# Patient Record
(demographics unavailable — no encounter records)

---

## 2024-12-09 NOTE — HISTORY OF PRESENT ILLNESS
[5] : 5 [Dull/Aching] : dull/aching [Tingling] : tingling [de-identified] : R elbow increased size at the olecranon 15 years ago It has been worse recently  Increased pain and swelling [FreeTextEntry1] : RT elbow [FreeTextEntry6] : swelling, discomfort, numb

## 2024-12-09 NOTE — PHYSICAL EXAM
[de-identified] : R elbow  Olecraon swelling Tender olecranon Palp bone spur Stiffness with extension  No erythema  Xrays exostosis olecranon

## 2025-01-26 NOTE — ASSESSMENT
[FreeTextEntry1] : I disucssed aspirationa nd injection  He declines due to longevitiy of issue  For R elbow excision exotosis and olecranon bursectomy R/B/A of surgery discussed with the patient. Risks including but not limited to infection, nerve damage, tendon damage, pain, stiffness, recurrence, no resolution of symptoms, loss of function, limb or life. They understand and agree to surgery  Return post op
<-- Click to add NO pertinent Family History